# Patient Record
Sex: FEMALE | Race: BLACK OR AFRICAN AMERICAN | Employment: FULL TIME | URBAN - METROPOLITAN AREA
[De-identification: names, ages, dates, MRNs, and addresses within clinical notes are randomized per-mention and may not be internally consistent; named-entity substitution may affect disease eponyms.]

---

## 2021-09-27 ENCOUNTER — OFFICE VISIT (OUTPATIENT)
Dept: NEUROLOGY | Age: 46
End: 2021-09-27
Payer: COMMERCIAL

## 2021-09-27 VITALS
HEART RATE: 76 BPM | DIASTOLIC BLOOD PRESSURE: 60 MMHG | WEIGHT: 144 LBS | HEIGHT: 62 IN | SYSTOLIC BLOOD PRESSURE: 118 MMHG | BODY MASS INDEX: 26.5 KG/M2 | RESPIRATION RATE: 18 BRPM | OXYGEN SATURATION: 99 %

## 2021-09-27 DIAGNOSIS — F40.240 CLAUSTROPHOBIA: ICD-10-CM

## 2021-09-27 DIAGNOSIS — G81.91 RIGHT HEMIPARESIS (HCC): Primary | ICD-10-CM

## 2021-09-27 PROCEDURE — 99204 OFFICE O/P NEW MOD 45 MIN: CPT | Performed by: PSYCHIATRY & NEUROLOGY

## 2021-09-27 RX ORDER — DIAZEPAM 2 MG/1
4 TABLET ORAL ONCE
Qty: 2 TABLET | Refills: 0 | Status: SHIPPED | OUTPATIENT
Start: 2021-09-27 | End: 2021-09-27

## 2021-09-27 NOTE — PROGRESS NOTES
Ms. Silvio Kim presents as a new patient for evaluation of numbness/tingling of right hand. She reported sudden onset of numbness of right side back of head and RUE on 8/2/21. The numbness of head resolved a couple weeks ago.

## 2021-09-27 NOTE — PATIENT INSTRUCTIONS
[facility]   Statement to Patients  April 1, 2014      In an effort to ensure the large volume of patient prescription refills is processed in the most efficient and expeditious manner, we are asking our patients to assist us by calling your Pharmacy for all prescription refills, this will include also your  Mail Order Pharmacy. The pharmacy will contact our office electronically to continue the refill process. Please do not wait until the last minute to call your pharmacy. We need at least 48 hours (2days) to fill prescriptions. We also encourage you to call your pharmacy before going to  your prescription to make sure it is ready. With regard to controlled substance prescription refill requests (narcotic refills) that need to be picked up at our office, we ask your cooperation by providing us with at least 72 hours (3days) notice that you will need a refill. We will not refill narcotic prescription refill requests after 4:00pm on any weekday, Monday through Thursday, or after 2:00pm on Fridays, or on the weekends. We encourage everyone to explore another way of getting your prescription refill request processed using [employer], our patient web portal through our electronic medical record system. [employer] is an efficient and effective way to communicate your medication request directly to the office and  downloadable as an riddhi on your smart phone . [employer] also features a review functionality that allows you to view your medication list as well as leave messages for your physician. Are you ready to get connected? If so please review the attatched instructions or speak to any of our staff to get you set up right away! Thank you so much for your cooperation. Should you have any questions please contact our Practice Administrator.     The Physicians and Staff,  [facility]       [id number]

## 2021-09-27 NOTE — PROGRESS NOTES
[facility]   NEW PATIENT EVALUATION/CONSULTATION       PATIENT NAME: Lexi Grant    MRN: [id number]    REASON FOR CONSULTATION: Right hand numbness since August 2021 09/27/21    HISTORY OF PRESENT ILLNESS:  Lexi Grant is a 40 y.o. right-hand-dominant female presents to [facility] neurology clinic for evaluation of distal right upper extremity numbness since 8/2/2021. Patient reports on that day she was cleaning the outside of her ear with a Q-tip where she had sudden onset of numbness in her right jaw the occipital region of her head rating down to her arm. Was at [facility] her  is from at the time and went to a local ER where she was given a Medrol Dosepak. Symptoms gradually improved largely resolving in the posterior head as well as jaw and upper arm but have persisted in her lower hand. 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 And really has not improved she sometimes notes residual numbness in the back of her head. Also endorses difficulty opening jars and other function with her right hand. No fluctuation in symptoms no nocturnal predominance. He does work on a computer thinking maybe this is related but does not appear to exacerbate it. She also notes some weakness in her right lower leg states that her knee will buckle at times. Endorses a severe headache in the region of numbness approximately 2 weeks ago which was new for her and has not recurred. Does not endorse any history of weight loss night sweats no other constitutional symptoms. Denies any other symptoms in the past.      PAST MEDICAL HISTORY:  Past Medical History:   Diagnosis Date    Abnormal Pap smear     1997    Rhesus isoimmunization unspecified as to episode of care in pregnancy        PAST SURGICAL HISTORY:  History reviewed.  No pertinent surgical history. FAMILY HISTORY:   Family History   Problem Relation Age of Onset    Elevated Lipids Mother     Hypertension Mother     Heart Disease Father     Arthritis-rheumatoid Maternal Grandmother     Diabetes Maternal Grandmother     Heart Disease Maternal Grandmother     Hypertension Maternal Grandmother     Psychiatric Disorder Maternal Grandmother     Stroke Maternal Grandmother     Cancer Maternal Grandfather     Hypertension Maternal Grandfather     Heart Disease Paternal Grandfather     Stroke Paternal Grandfather          SOCIAL HISTORY:  Social History     Socioeconomic History    Marital status:      Spouse name: Not on file    Number of children: Not on file    Years of education: Not on file    Highest education level: Not on file   Tobacco Use    Smoking status: Never Smoker    Smokeless tobacco: Never Used   Substance and Sexual Activity    Alcohol use: Yes     Alcohol/week: 1.7 standard drinks     Types: 2 Glasses of wine per week     Comment: prior to pregnancy    Drug use: No    Sexual activity: Yes     Partners: Male     Birth control/protection: Pill     Social Determinants of Health     Financial Resource Strain:     Difficulty of Paying Living Expenses:    Food Insecurity:     Worried About Running Out of Food in the Last Year:     [address] in the Last Year:    Transportation Needs:     Lack of Transportation (Medical):      Lack of Transportation (Non-Medical):    Physical Activity:     Days of Exercise per Week:     Minutes of Exercise per Session:    Stress:     Feeling of Stress :    Social Connections:     Frequency of Communication with Friends and Family:     Frequency of Social Gatherings with Friends and Family:     Attends Nondenominational Services:     Active Member of Clubs or Organizations:     Attends Club or Organization Meetings:     Marital Status:          MEDICATIONS:   Current Outpatient Medications   Medication Sig Dispense Refill    diazePAM (VALIUM) 2 mg tablet Take 2 Tablets by mouth once for 1 dose. Max Daily Amount: 4 mg. 2 Tablet 0    oxycodone-acetaminophen (PERCOCET) 5-325 mg per tablet Take 1-2 Tabs by mouth every four (4) hours as needed. 10 Tab 0    PNV Comb#8-Iron-Folic Acid-DHA 06-6-8-013 mg Cmpk Take 1 Tab by mouth daily. 30 Tab 10    albuterol (PROVENTIL, VENTOLIN) 90 mcg/Actuation inhaler Take 2 Puffs by inhalation every six (6) hours as needed. ALLERGIES:  No Known Allergies      REVIEW OF SYSTEMS:  10 point ROS reviewed with patient. Please see scanned document under media. PHYSICAL EXAM:  Vital Signs:   Visit Vitals  /60   Pulse 76   Resp 18   Ht 5' 2\" (1.575 m)   Wt 144 lb (65.3 kg)   SpO2 99%   BMI 26.34 kg/m²     Pleasant female resting notably in exam room in no distress. HEENT appears grossly unremarkable. Neck supple. Cardiovascular demonstrates constant S1/S2 regular rhythm. Pulmonary demonstrates equal entry bilaterally. Extremities are warm/dry. Neurologically, patient appears alert and oriented attention appears intact. Speech is clear, language fluent. Cranials 2 through 12 appear grossly unremarkable. Motorically, patient has normal bulk and tone, strength in left arm and leg is 5 out of 5. In the right arm, deltoid is 4+ out of 5, biceps is 5- out of 5 triceps is 4 out of 5. Wrist extension is 5- out of 5 weeks flexion is 5 out of 5. Iliopsoas is 4+ out of 5 hip abduction abduction are 5- out of 5. Knee extension is 5 out of 5 knee flexion is 4 out of 5. Plantar flexion is 4out of 5 dorsiflexion 5- out of 5. Sensation is grossly intact in upper and lower extremities distally. Coordination is intact in upper extremities. Reflexes are 3+ in upper and lower extremities with suprapatellar and crossed abduction, 1 beat of nonsustained clonus is noted bilaterally Babinski is mute. Leonidas Kennedy is absent bilaterally tremors appears present in right hand.   Primary gait and station appear unremarkable tandem gait intact.     PERTINENT DATA:  MRI Cervical spine - Unremarkable per report    ASSESSMENT:      Dannielle Mendes is a 40year old RH dominant female with no sniffing past medical history presents to [facility] neurolog[facility] clinic for evaluation of distal right upper extremity numbness with right hemiparesis noted on exam    PLAN:  Right hemiparesis:  Affecting upper/lower extremity, initially with cervicogenic location less clear given reportedly normal imaging  Will plan to pursue MRI brain to evaluate for inflammatory, neoplastic or vascular lesion  Consider NCS/EMG pending this    Follow-up in 3 weeks to discuss MRI results    Henriette Bence, MD       CC Referring provider:  Venessa Pool MD

## 2021-10-26 ENCOUNTER — TELEPHONE (OUTPATIENT)
Dept: NEUROLOGY | Age: 46
End: 2021-10-26

## 2021-10-26 NOTE — TELEPHONE ENCOUNTER
I left a message stating she was supposed to have imaging done prior to today's follow up appointment. I stated she may keep the appointment if she needs to.

## 2021-11-16 ENCOUNTER — TELEPHONE (OUTPATIENT)
Dept: NEUROLOGY | Age: 46
End: 2021-11-16

## 2021-11-16 NOTE — TELEPHONE ENCOUNTER
Patient calling stating she brought a disc to Dr. Rafia Tiwari at her last appt and she would like to know if she can pick it up.  Please call

## 2022-01-06 ENCOUNTER — TELEPHONE (OUTPATIENT)
Dept: NEUROLOGY | Age: 47
End: 2022-01-06

## 2022-01-06 NOTE — TELEPHONE ENCOUNTER
Patient left voicemail to find out if her MRI disc was ready to be picked up.  Please call-she said she dropped it off at last appt